# Patient Record
Sex: MALE | Race: OTHER | HISPANIC OR LATINO | URBAN - METROPOLITAN AREA
[De-identification: names, ages, dates, MRNs, and addresses within clinical notes are randomized per-mention and may not be internally consistent; named-entity substitution may affect disease eponyms.]

---

## 2021-01-09 ENCOUNTER — EMERGENCY (EMERGENCY)
Facility: HOSPITAL | Age: 21
LOS: 1 days | Discharge: HOME | End: 2021-01-09
Attending: EMERGENCY MEDICINE | Admitting: EMERGENCY MEDICINE
Payer: COMMERCIAL

## 2021-01-09 VITALS
WEIGHT: 201.94 LBS | SYSTOLIC BLOOD PRESSURE: 142 MMHG | DIASTOLIC BLOOD PRESSURE: 86 MMHG | HEIGHT: 70 IN | TEMPERATURE: 98 F | OXYGEN SATURATION: 98 % | HEART RATE: 84 BPM | RESPIRATION RATE: 20 BRPM

## 2021-01-09 LAB
APPEARANCE UR: CLEAR — SIGNIFICANT CHANGE UP
BILIRUB UR-MCNC: NEGATIVE — SIGNIFICANT CHANGE UP
COLOR SPEC: YELLOW — SIGNIFICANT CHANGE UP
DIFF PNL FLD: NEGATIVE — SIGNIFICANT CHANGE UP
GLUCOSE UR QL: NEGATIVE MG/DL — SIGNIFICANT CHANGE UP
KETONES UR-MCNC: NEGATIVE — SIGNIFICANT CHANGE UP
LEUKOCYTE ESTERASE UR-ACNC: NEGATIVE — SIGNIFICANT CHANGE UP
NITRITE UR-MCNC: NEGATIVE — SIGNIFICANT CHANGE UP
PH UR: 6.5 — SIGNIFICANT CHANGE UP (ref 5–8)
PROT UR-MCNC: NEGATIVE MG/DL — SIGNIFICANT CHANGE UP
SP GR SPEC: 1.01 — SIGNIFICANT CHANGE UP (ref 1.01–1.02)
UROBILINOGEN FLD QL: NEGATIVE MG/DL — SIGNIFICANT CHANGE UP

## 2021-01-09 PROCEDURE — 99283 EMERGENCY DEPT VISIT LOW MDM: CPT | Mod: 25

## 2021-01-09 PROCEDURE — 81003 URINALYSIS AUTO W/O SCOPE: CPT

## 2021-01-09 PROCEDURE — 99284 EMERGENCY DEPT VISIT MOD MDM: CPT

## 2021-01-09 RX ORDER — POLYETHYLENE GLYCOL 3350 17 G/17G
17 POWDER, FOR SOLUTION ORAL ONCE
Refills: 0 | Status: COMPLETED | OUTPATIENT
Start: 2021-01-09 | End: 2021-01-09

## 2021-01-09 RX ADMIN — POLYETHYLENE GLYCOL 3350 17 GRAM(S): 17 POWDER, FOR SOLUTION ORAL at 22:14

## 2021-01-09 NOTE — ED PROVIDER NOTE - NSFOLLOWUPINSTRUCTIONS_ED_ALL_ED_FT
Constipation    WHAT YOU NEED TO KNOW:    Constipation is when you have hard, dry bowel movements, or you go longer than usual between bowel movements.     DISCHARGE INSTRUCTIONS:    Call your doctor if:   •You have blood in your bowel movements.      •You have a fever and abdominal pain with the constipation.      •Your constipation gets worse.       •You start to vomit.      •You have questions or concerns about your condition or care.      Medicines:   •Medicine such as a laxative may help relax and loosen your intestines to help you have a bowel movement. Your provider may recommend you only use laxatives for a short time. Long-term use may make your bowels dependent on the medicine.      •Take your medicine as directed. Contact your healthcare provider if you think your medicine is not helping or if you have side effects. Tell him of her if you are allergic to any medicine. Keep a list of the medicines, vitamins, and herbs you take. Include the amounts, and when and why you take them. Bring the list or the pill bottles to follow-up visits. Carry your medicine list with you in case of an emergency.      Relieve constipation:   •A suppository may be used to help soften your bowel movements. This may make them easier to pass. A suppository is guided into your rectum through your anus.  Suppository for Constipation           •An enema is liquid medicine used to clear bowel movement from your rectum. The medicine is put into your rectum through your anus.  Enemas           Prevent constipation:   •Drink liquids as directed. You may need to drink extra liquids to help soften and move your bowels. Ask how much liquid to drink each day and which liquids are best for you.       •Eat high-fiber foods. This may help decrease constipation by adding bulk to your bowel movements. High-fiber foods include fruit, vegetables, whole-grain breads and cereals, and beans. Your healthcare provider or dietitian can help you create a high-fiber meal plan. Your provider may also recommend a fiber supplement if you cannot get enough fiber from food.              •Exercise regularly. Regular physical activity can help stimulate your intestines. Walking is a good exercise to prevent or relieve constipation. Ask which exercises are best for you.  Walking for Exercise           •Schedule a time each day to have a bowel movement. This may help train your body to have regular bowel movements. Bend forward while you are on the toilet to help move the bowel movement out. Sit on the toilet for at least 10 minutes, even if you do not have a bowel movement.       •Talk to your healthcare provider about your medicines. Certain medicines, such as opioids, can cause constipation. Your provider may be able to make medicine changes. For example, he or she may change the kind of medicine, or change when you take it.      Follow up with your healthcare provider as directed: Write down your questions so you remember to ask them during your visits.

## 2021-01-09 NOTE — ED PROVIDER NOTE - PROVIDER TOKENS
PROVIDER:[TOKEN:[[id number]],FOLLOWUP:[1-3 Days]],PROVIDER:[TOKEN:[[id number]],FOLLOWUP:[1-3 Days]]

## 2021-01-09 NOTE — ED PROVIDER NOTE - PROGRESS NOTE DETAILS
pt has had multiple BMs, is waiting for it to stop now so he can go home (long ride) feels much better now, ready to go

## 2021-01-09 NOTE — ED PROVIDER NOTE - PATIENT PORTAL LINK FT
You can access the FollowMyHealth Patient Portal offered by [facility] by registering at the following website: [email or web address]. By joining [employer]’s FollowMyHealth portal, you will also be able to view your health information using other applications (apps) compatible with our system.

## 2021-01-09 NOTE — ED PROVIDER NOTE - CARE PROVIDER_API CALL
Familia Sarkar (DO)  Internal Medicine  [address] [place] [address]  Phone: [phone]  Fax: [phone]  Follow Up Time: 1-3 Days    Kendrick Powell  GASTROENTEROLOGY  [address] [place] [address]  Phone: [phone]  Fax: [phone]  Follow Up Time: 1-3 Days

## 2021-01-09 NOTE — ED PROVIDER NOTE - OBJECTIVE STATEMENT
20 y.o. M c/o constipation x 1 week, pt denies significant h/o constipation or change in diet, no n/v, no fever, after a few days took miralax, had large bm with relief, then only had small amounts bm each day since, has not tried miralax again, [facility], was advised to take dulcolax and avoid miralax. now feels pressure in lower back from the constipation, feels gas, is still passing flatulance. also feels pressure with urination, but no hematuria, no dysuria, denies possibility of std

## 2021-01-09 NOTE — ED PROVIDER NOTE - CARDIAC, MLM
Normal rate, regular rhythm.  Heart sounds S1, S2.  No murmurs, rubs or gallops. Essential hypertension

## 2021-01-09 NOTE — ED PROVIDER NOTE - CLINICAL SUMMARY MEDICAL DECISION MAKING FREE TEXT BOX
pt with 1 wk constipation, improved earlier in the week with 1 dose miralax, now afraid to take it again due to concern over developing an effect of long term laxative abuse - pt has never taken a laxative before, reassured patient this is not a concern at this time, advise pt to take miralax again and can f/u with GI, pt requests to take miralax here to make sure he will have a BM

## 2021-01-10 VITALS
DIASTOLIC BLOOD PRESSURE: 78 MMHG | OXYGEN SATURATION: 100 % | TEMPERATURE: 98 F | SYSTOLIC BLOOD PRESSURE: 138 MMHG | RESPIRATION RATE: 18 BRPM | HEART RATE: 76 BPM

## 2022-05-05 NOTE — ED ADULT NURSE NOTE - NSIMPLEMENTINTERV_GEN_ALL_ED
No
Implemented All Universal Safety Interventions:  [place] to call system. Call bell, personal items and telephone within reach. Instruct patient to call for assistance. Room bathroom lighting operational. Non-slip footwear when patient is off stretcher. Physically safe environment: no spills, clutter or unnecessary equipment. Stretcher in lowest position, wheels locked, appropriate side rails in place.

## 2022-07-18 ENCOUNTER — EMERGENCY (EMERGENCY)
Facility: HOSPITAL | Age: 22
LOS: 1 days | Discharge: HOME | End: 2022-07-18
Attending: EMERGENCY MEDICINE
Payer: COMMERCIAL

## 2022-07-18 VITALS
HEART RATE: 107 BPM | SYSTOLIC BLOOD PRESSURE: 124 MMHG | DIASTOLIC BLOOD PRESSURE: 75 MMHG | HEIGHT: 70 IN | OXYGEN SATURATION: 99 % | TEMPERATURE: 98 F | WEIGHT: 205.03 LBS | RESPIRATION RATE: 18 BRPM

## 2022-07-18 PROCEDURE — 93010 ELECTROCARDIOGRAM REPORT: CPT

## 2022-07-18 PROCEDURE — 99284 EMERGENCY DEPT VISIT MOD MDM: CPT

## 2022-07-19 VITALS
OXYGEN SATURATION: 99 % | SYSTOLIC BLOOD PRESSURE: 137 MMHG | HEART RATE: 82 BPM | RESPIRATION RATE: 18 BRPM | DIASTOLIC BLOOD PRESSURE: 80 MMHG

## 2022-07-19 PROBLEM — Z78.9 OTHER SPECIFIED HEALTH STATUS: Chronic | Status: ACTIVE | Noted: 2021-01-10

## 2022-07-19 PROCEDURE — 99283 EMERGENCY DEPT VISIT LOW MDM: CPT

## 2022-07-19 PROCEDURE — 93005 ELECTROCARDIOGRAM TRACING: CPT

## 2022-07-19 NOTE — ED ADULT NURSE NOTE - OBJECTIVE STATEMENT
22 yo male lying on bed c/o panic attack and palpitations that started 30 minutes prior to arrival to the ED. Patient denies chest pain, shortness of breath, abdominal pain, nausea, and vomiting at this time.

## 2022-07-19 NOTE — ED PROVIDER NOTE - CLINICAL SUMMARY MEDICAL DECISION MAKING FREE TEXT BOX
Well appearing 21 year old male patient with history of panic order, currently resolved. Patient is asymptomatic and will provide contact for psychologist the coordinator. Well appearing 21 year old male patient with history of panic order, currently resolved. Patient is asymptomatic, in company of supportive adult sister. Will provide contact for psychologist via care coordinator.  Pt is well appearing, has no new complaints and able to walk with normal gait. Pt is stable for discharge and follow up with medical doctor. Pt educated on care and need for follow up. Discussed anticipatory guidance and return precautions. Questions answered. I had a detailed discussion with the patient and/or guardian regarding the historical points, exam findings, and any diagnostic results supporting the discharge diagnosis.

## 2022-07-19 NOTE — ED PROVIDER NOTE - PATIENT PORTAL LINK FT
You can access the FollowMyHealth Patient Portal offered by [facility] by registering at the following website: [email or web address]. By joining [employer]’s FollowMyHealth portal, you will also be able to view your health information using other applications (apps) compatible with our system.

## 2022-07-19 NOTE — ED PROVIDER NOTE - NSFOLLOWUPINSTRUCTIONS_ED_ALL_ED_FT
A panic attack is a sudden episode of severe anxiety, fear, or discomfort that causes physical and emotional symptoms. The attack may be in response to something frightening, or it may occur for no known reason.    Symptoms of a panic attack can be similar to symptoms of a heart attack or stroke. It is important to see your health care provider when you have a panic attack so that these conditions can be ruled out.    A panic attack is a symptom of another condition. Most panic attacks go away with treatment of the underlying problem. If you have panic attacks often, you may have a condition called panic disorder.      What are the causes?    A panic attack may be caused by:  •An extreme, life-threatening situation, such as a war or natural disaster.      •An anxiety disorder, such as post-traumatic stress disorder.      •Depression.      •Certain medical conditions, including heart problems, neurological conditions, and infections.      •Certain over-the-counter and prescription medicines.      •Illegal drugs that increase heart rate and blood pressure, such as methamphetamine.      •Alcohol.      •Supplements that increase anxiety.      •Panic disorder.        What increases the risk?    You are more likely to develop this condition if:  •You have an anxiety disorder.      •You have another mental health condition.      •You take certain medicines.      •You use alcohol, illegal drugs, or other substances.      •You are under extreme stress.      •A life event is causing increased feelings of anxiety and depression.        What are the signs or symptoms?    A panic attack starts suddenly, usually lasts about 20 minutes, and occurs with one or more of the following:  •A pounding heart.      •A feeling that your heart is beating irregularly or faster than normal (palpitations).      •Sweating.      •Trembling or shaking.      •Shortness of breath or feeling smothered.      •Feeling choked.      •Chest pain or discomfort.      •Nausea or a strange feeling in your stomach.      •Dizziness, feeling lightheaded, or feeling like you might faint.      •Chills or hot flashes.      •Numbness or tingling in your lips, hands, or feet.      •Feeling confused, or feeling that you are not yourself.      •Fear of losing control or being emotionally unstable.      •Fear of dying.        How is this diagnosed?     A panic attack is diagnosed with an assessment by your health care provider. During the assessment your health care provider will ask questions about:  •Your history of anxiety, depression, and panic attacks.      •Your medical history.      •Whether you drink alcohol, use illegal drugs, take supplements, or take medicines. Be honest about your substance use.      Your health care provider may also:  •Order blood tests or other kinds of tests to rule out serious medical conditions.      •Refer you to a mental health professional for further evaluation.        How is this treated?    Treatment depends on the cause of the panic attack:  •If the cause is a medical problem, your health care provider will either treat that problem or refer you to a specialist.      •If the cause is emotional, you may be given anti-anxiety medicines or referred to a counselor. These medicines may reduce how often attacks happen, reduce how severe the attacks are, and lower anxiety.      •If the cause is a medicine, your health care provider may tell you to stop the medicine, change your dose, or take a different medicine.      •If the cause is a drug, treatment may involve letting the drug wear off and taking medicine to help the drug leave your body or to counteract its effects. Attacks caused by drug abuse may continue even if you stop using the drug.        Follow these instructions at home:    •Take over-the-counter and prescription medicines only as told by your health care provider.      •If you feel anxious, limit your caffeine intake.    •Take good care of your physical and mental health by:  •Eating a balanced diet that includes plenty of fresh fruits and vegetables, whole grains, lean meats, and low-fat dairy.      •Getting plenty of rest. Try to get 7–8 hours of uninterrupted sleep each night.      •Exercising regularly. Try to get 30 minutes of physical activity at least 5 days a week.      •Not smoking. Talk to your health care provider if you need help quitting.      •Limiting alcohol intake to no more than 1 drink a day for nonpregnant women and 2 drinks a day for men. One drink equals 12 oz of beer, 5 oz of wine, or 1½ oz of hard liquor.        •Keep all follow-up visits as told by your health care provider. This is important. Panic attacks may have underlying physical or emotional problems that take time to accurately diagnose.        Contact a health care provider if:    •Your symptoms do not improve, or they get worse.      •You are not able to take your medicine as prescribed because of side effects.        Get help right away if:    •You have serious thoughts about hurting yourself or others.      •You have symptoms of a panic attack. Do not drive yourself to the hospital. Have someone else drive you or call an ambulance.      If you ever feel like you may hurt yourself or others, or you have thoughts about taking your own life, get help right away. You can go to your nearest emergency department or call:   • Your local emergency services (911 in the [place]       • A suicide crisis helpline, such as the National Suicide Prevention Lifeline at 1-307.723.5018. This is open 24 hours a day.         Summary    •A panic attack is a sign of a serious health or mental health condition. Get help right away. Do not drive yourself to the hospital. Have someone else drive you or call an ambulance.      •Always see a health care provider to have the reasons for the panic attack correctly diagnosed.      •If your panic attack was caused by a physical problem, follow your health care provider's suggestions for medicine, referral to a specialist, and lifestyle changes.      •If your panic attack was caused by an emotional problem, follow through with counseling from a qualified mental health specialist.      •If you feel like you may hurt yourself or others, call 911 and get help right away.      This information is not intended to replace advice given to you by your health care provider. Make sure you discuss any questions you have with your health care provider.

## 2022-07-19 NOTE — ED PROVIDER NOTE - OBJECTIVE STATEMENT
21 year old male with a PHMx of panic disorders presents to the ED with complaints of panic attack approximately one hour prior to ED arrival. Patient states he was carrying a heavy box earlier today and put in his mind that he might developed a hernia from carrying heavy weight. Patient thought process led him to have panic attacks. Patient denies any abdominal pain, chest pain, and shortness of breath. On review: Patient's panic attacks has resolved and he states he is currently feeling better. Patient otherwise denies any suicidal ideation, homicidal and hallucinations. NKDA. 21 year old male with a PHMx of panic disorders presents to the ED with complaints of panic attack approximately one hour prior to ED arrival. Patient states he was carrying a heavy box earlier today and put in his mind that he might developed a hernia from carrying heavy weight. Patient thought process led him to have panic attacks. Patient denies any abdominal pain, chest pain, and shortness of breath. On review: Patients panic attacks has resolved and he states he is currently feeling better. Patient reports he had similar panic attack approximately 2 months ago. Patient otherwise denies any suicidal ideation, homicidal and hallucinations. NKDA. 21 year old male with a PHMx of panic disorders presents to the ED with complaints of panic attack approximately one hour prior to ED arrival. Patient states he was carrying a heavy box earlier today and put in his mind that he might developed a hernia from carrying heavy weight. Patient's thought process led him to having panic attack.. Patient denies any abdominal pain, chest pain, and shortness of breath. Patient's panic attacks has resolved and he states he is currently feeling better. Patient reports he had similar panic attack approximately 2 months ago. Patient otherwise denies any suicidal ideation, homicidal and hallucinations. NKDA.

## 2022-09-20 NOTE — ED ADULT NURSE NOTE - NS ED NURSE RECORD ANOTHER VITAL SIGN
Yes Ivermectin Counseling:  Patient instructed to take medication on an empty stomach with a full glass of water.  Patient informed of potential adverse effects including but not limited to nausea, diarrhea, dizziness, itching, and swelling of the extremities or lymph nodes.  The patient verbalized understanding of the proper use and possible adverse effects of ivermectin.  All of the patient's questions and concerns were addressed.

## 2024-03-01 NOTE — ED PROVIDER NOTE - EYES, MLM
Start antibiotics.    Clear bilaterally, pupils equal, round and reactive to light. Clear bilaterally, pupils equal, round and reactive to light. Right cataract since "childhood"